# Patient Record
Sex: MALE | Race: WHITE | URBAN - METROPOLITAN AREA
[De-identification: names, ages, dates, MRNs, and addresses within clinical notes are randomized per-mention and may not be internally consistent; named-entity substitution may affect disease eponyms.]

---

## 2017-02-15 ENCOUNTER — HISTORICAL (OUTPATIENT)
Dept: ADMINISTRATIVE | Facility: HOSPITAL | Age: 62
End: 2017-02-15

## 2017-08-27 ENCOUNTER — HISTORICAL (OUTPATIENT)
Dept: ADMINISTRATIVE | Facility: HOSPITAL | Age: 62
End: 2017-08-27

## 2022-09-13 NOTE — ED PROVIDER NOTES
Patient:   Raphael Hayes            MRN: [id number]            FIN: [id number]               Age:   62 years     Sex:  Male     :  1955   Associated Diagnoses:   Vertigo   Author:   Tucker Gorman      Basic Information   Time seen: Date & time 2017 02:50:00.   History source: Patient, sister.   Arrival mode: Ambulance.   Additional information: Patient's physician(s): MONICO Andres MD, Chief Complaint from Nursing Triage Note : Chief Complaint   2017 2:34 CDT       Chief Complaint           patient reports nausea, dizziness and headache since   .      History of Present Illness   The patient presents with dizziness and Male with history of cerebral infarct since childhood and chronic lifelong right sided hemiparalysis currently living at [facility] reported to staff this evening waking from sleep with headache spinning dizziness and nausea.  Patient given Tylenol for headache.  Patient denies recent URI symptoms.  Patient transported to ER for evaluation..  The onset was 3  hours ago.  The character of symptoms is spinning.  The degree at present is moderate.  Exacerbating factors:  not changing position or turning head.  Therapy today: over the counter medications including Tylenol and emergency medical services.        Review of Systems   Constitutional symptoms:  No fever, no chills, no fatigue.    Skin symptoms:  Negative except as documented in HPI.   Eye symptoms:  No diplopia, no blurred vision.    ENMT symptoms:  No ear pain, no sore throat, no nasal congestion, no sinus pain.    Respiratory symptoms:  No shortness of breath, no cough, no sputum production.    Cardiovascular symptoms:  No chest pain, no palpitations.    Gastrointestinal symptoms:  Nausea, no abdominal pain, no vomiting, no diarrhea.    Musculoskeletal symptoms:  Negative except as documented in HPI.   Neurologic symptoms:  Headache, No altered level of consciousness,    Psychiatric symptoms:   Negative except as documented in HPI.   Endocrine symptoms:  Negative except as documented in HPI.   Hematologic/Lymphatic symptoms:  Negative except as documented in HPI.      Health Status   Allergies:    Allergic Reactions (Selected)  No Known Medication Allergies,    Allergies (1) Active Reaction  No Known Medication Allergies None Documented  .   Medications:  (Selected)   Prescriptions  Prescribed  Voltaren- mg oral tablet, extended release: 100 mg = 1 tab(s), Oral, Daily, # 90 tab(s), 0 Refill(s)  Documented Medications  Documented  Flomax 0.4 mg oral capsule: 0.4 mg = 1 cap(s), Oral, Daily, # 30 cap(s), 0 Refill(s)  Florastor 250 mg oral capsule: 250 mg = 1 cap(s), Oral, BID, PRN PRN for loose stool, 0 Refill(s)  Fosamax 70 mg oral tablet: 70 mg = 1 tab(s), Oral, qWeek, # 12 tab(s), 0 Refill(s)  Multiple Vitamins oral cap: 1 cap(s), Oral, Daily, 0 Refill(s)  Norco 5 mg-325 mg oral tablet: 1 tab(s), Oral, q4hr, PRN PRN for pain, # 18 tab(s), 0 Refill(s)  Prilosec 20 mg oral DR capsule: 20 mg = 1 cap(s), Oral, Daily, 0 Refill(s)  Proctosedyl rectal ointment: 0 Refill(s)  Synthroid 88 mcg (0.088 mg) oral tablet: 88 mcg = 1 tab(s), Oral, Daily, # 30 tab(s), 0 Refill(s)  Vitamin D3 5000 intl units oral capsule: 5,000 IntUnit = 1 cap(s), Oral, Daily, with food, # 100 cap(s), 0 Refill(s)  calcium (as calcium citrate) 250 mg oral tablet: 250 mg = 1 tab(s), Oral, BID, 0 Refill(s)  menthol 2% topical gel: 1 alexi, TOP, BID, PRN PRN as needed for pain, # 226 gm, 0 Refill(s).      Past Medical/ Family/ Social History   Medical history:    No active or resolved past medical history items have been selected or recorded..   Surgical history:    Tonsils obstructive ([id number])..   Family history:    Unable to obtain..      Physical Examination               Vital Signs   Vital Signs   8/27/2017 2:34 CDT       Temperature Oral          36.8 DegC                             Peripheral Pulse Rate     110 bpm  HI                              Respiratory Rate          20 br/min                             SpO2                      96 %                             Oxygen Therapy            Room air                             Systolic Blood Pressure   141 mmHg  HI                             Diastolic Blood Pressure  95 mmHg  HI  .      Vital Signs (last 24 hrs)_____  Last Charted___________  Temp Oral     36.8 DegC  (AUG 27 02:34)  Heart Rate Peripheral   H 110bpm  (AUG 27 02:34)  Resp Rate         20 br/min  (AUG 27 02:34)  SBP      H 141mmHg  (AUG 27 02:34)  DBP      H 95mmHg  (AUG 27 02:34)  SpO2      96 %  (AUG 27 02:34)  .   Measurements   8/27/2017 2:34 CDT       Weight Dosing             75 kg                             Weight Measured and Calculated in Lbs     165.34 lb                             Weight Estimated          75 kg  .   Basic Oxygen Information   8/27/2017 2:34 CDT       SpO2                      96 %                             Oxygen Therapy            Room air  .   General:  Alert, no acute distress, Awake alert male attended by sister.    Skin:  Warm, dry, pink, normal for ethnicity.    Head:  Normocephalic, atraumatic.    Neck:  Supple, trachea midline, no JVD.    Eye:  Pupils are equal, round and reactive to light, extraocular movements are intact, normal conjunctiva.    Ears, nose, mouth and throat:  Oral mucosa moist.   Cardiovascular:  Normal peripheral perfusion, Tachycardia.    Respiratory:  Lungs are clear to auscultation, respirations are non-labored, breath sounds are equal, Symmetrical chest wall expansion.    Chest wall:  No tenderness, No deformity.    Back:  Nontender, Normal range of motion.    Musculoskeletal:  Right joann-paralysis.   Gastrointestinal:  Soft, Nontender, Non distended.    Neurological:  Level of consciousness: Appropriate for age.   Psychiatric:  Cooperative, mild anxiety.       Medical Decision Making   Documents reviewed:  Emergency department nurses' notes.   Orders  Launch  Orders   Laboratory:  INR - Protime (Order): Stat collect, 8/27/2017 3:04 CDT, Blood, Lab Collect, Print Label By Order Location, 8/27/2017 3:04 CDT  PTT (Order): Stat collect, 8/27/2017 3:04 CDT, Blood, Lab Collect, Print Label By Order Location, 8/27/2017 3:04 CDT  Troponin-I (Order): Stat collect, 8/27/2017 3:04 CDT, Blood, Lab Collect, Print Label By Order Location, 8/27/2017 3:04 CDT  Urinalysis Complete a reflex to culture (Order): Stat collect, Urine, 8/27/2017 3:03 CDT, Nurse collect, Print Label By Order Location  CBC w/ Auto Diff (Order): Now collect, 8/27/2017 3:03 CDT, Blood, Lab Collect, Print Label By Order Location, 8/27/2017 3:03 CDT  CMP (Order): Stat collect, 8/27/2017 3:03 CDT, Blood, Lab Collect, Print Label By Order Location, 8/27/2017 3:03 CDT  Patient Care:  IV Fluids (Order)  Pharmacy:  meclizine (Order): 50 mg, form: Tab, Oral, Once, first dose 8/27/2017 3:04 CDT, stop date 8/27/2017 3:04 CDT, 24  Zofran 2 mg/mL injectable solution (Order): 4 mg, form: Injection, IV Push, Once, first dose 8/27/2017 3:04 CDT, stop date 8/27/2017 3:04 CDT, 24  IVF Normal Saline NS Bolus 1000ml 1000 mL (Order): 1,000 mL, 1,000 mL, IV, 1000 mL, start date 8/27/2017 3:03 CDT  Radiology:  CT Head W/O Contrast (Order): Stat, 8/27/2017 3:02 CDT, Dizziness , vertigo, None, Stretcher, Rad Type, Schedule this test, [facility]  Cardiology:  EKG Adult 12 Lead (Order): 8/27/2017 3:05 CDT, Stat, Once, 24, 8/27/2017 3:05 CDT, -1, -1, 8/27/2017 3:05 CDT, Launch Orders   Pharmacy:  Ativan (Order): 1 mg, form: Tab, Oral, Once, first dose 8/27/2017 5:46 CDT, stop date 8/27/2017 5:46 CDT, 24  Lopressor 1 mg/mL injectable solution (Order): 5 mg, form: Injection, IV Push, Once-NOW, first dose 8/27/2017 5:45 CDT, stop date 8/27/2017 5:45 CDT, 965,196  .    Electrocardiogram:  Time 8/27/2017 03:11:00, rate 109, No ST-T changes, no ectopy, normal LA & QRS intervals, EP Interp, The Rhythm is sinus tachycardia.  , The Axis is  leftward.  .    Results review:  Lab results : Lab View   8/27/2017 3:14 CDT       Sodium Lvl                140 mmol/L                             Potassium Lvl             3.7 mmol/L                             Chloride                  105 mmol/L                             CO2                       27.0 mmol/L                             Calcium Lvl               8.3 mg/dL  LOW                             Glucose Lvl               106 mg/dL                             BUN                       17.0 mg/dL                             Creatinine                0.90 mg/dL                             eGFR-AA                   >60 mL/min/1.73 m2  NA                             eGFR-JULIEN                  >60 mL/min/1.73 m2  NA                             Bili Total                0.4 mg/dL                             Bili Direct               0.10 mg/dL                             Bili Indirect             0.30 mg/dL                             AST                       12 unit/L  LOW                             ALT                       22 unit/L                             Alk Phos                  55 unit/L                             Total Protein             7.6 gm/dL                             Albumin Lvl               4.00 gm/dL                             Globulin                  3.60 gm/dL  HI                             A/G Ratio                 1.1  NA                             Troponin-I                <0.02 ng/mL                             PT                        13.3 second(s)                             INR                       1.03                             PTT                       30.7 second(s)                             WBC                       4.4 x10(3)/mcL  LOW                             RBC                       5.50 x10(6)/mcL                             Hgb                       15.5 gm/dL                             Hct                       48.1 %                              Platelet                  136 x10(3)/mcL                             MCV                       87.5 fL                             MCH                       28.2 pg                             MCHC                      32.2 gm/dL  LOW                             RDW                       12.5 %                             MPV                       9.0 fL  LOW                             Abs Neut                  2.65 x10(3)/mcL                             Neutro Auto               61 %  NA                             Lymph Auto                30 %  NA                             Mono Auto                 9 %  NA                             Eos Auto                  0 %  NA                             Abs Eos                   0.0 x10(3)/mcL                             Basophil Auto             0 %  NA                             Abs Neutro                2.65 x10(3)/mcL                             Abs Lymph                 1.3 x10(3)/mcL                             Abs Mono                  0.4 x10(3)/mcL                             Abs Baso                  0.0 x10(3)/mcL    8/7/2017 5:32 CDT        TSH                       4.250 mIU/mL  HI    ,    No qualifying data available.    Head Computed Tomography:  No acute disease process, no intracranial hemorrhage, interpretation by Radiologist, Dr Helms.  Remote lacunar infarcts seen in basal ganglia.       Reexamination/ Reevaluation   Vital signs   results included from flowsheet : Vital Signs   8/27/2017 6:32 CDT       Peripheral Pulse Rate     102 bpm  HI                             Respiratory Rate          20 br/min                             SpO2                      95 %                             Oxygen Therapy            Room air                             Systolic Blood Pressure   117 mmHg                             Diastolic Blood Pressure  86 mmHg                             Mean Arterial Pressure, Cuff              96 mmHg      results included from  flowsheet : Vital Signs   8/27/2017 6:02 CDT       Peripheral Pulse Rate     113 bpm  HI                             Respiratory Rate          18 br/min                             SpO2                      96 %                             Systolic Blood Pressure   132 mmHg                             Diastolic Blood Pressure  94 mmHg  HI      Basic Oxygen Information   8/27/2017 2:34 CDT       SpO2                      96 %                             Oxygen Therapy            Room air     Assessment: While here in the ED, the pt remained hemodynamically stable with ABC's intact and in NaD.  Pt is afebrile and nontoxic.  Pt awake alert resting in ED room with no further dizziness nor headache.  Patient tolerates oral medication.  Pt finishing IV fluids.  Patient agitated and reports he is ready for discharge.  Pt and sister understand lab, CT results, concern for dizziness suspect vertigo.  Sister understands discharge plan with Rx meclizine and patient follow-up with primary care physician or return to ER sooner if worsens.  Sister verbalizes understanding agreeing and satisfied with plan.  .      Impression and Plan   Diagnosis   Vertigo ([id number])   Plan   Condition: Improved, Stable.    Disposition: Discharged: Time  8/27/2017 06:11:00, to a nursing home.    Prescriptions: Launch prescriptions   Pharmacy:  meclizine 25 mg oral tablet (Prescribe): 50 mg = 2 tab(s), Oral, TID, PRN PRN for dizziness, X 3 day(s), # 18 tab(s), 0 Refill(s)  .    Patient was given the following educational materials: Vertigo.    Follow up with: Follow up with primary care provider Recommend Tylenol ibuprofen or hydrocodone as needed for pain.  Recommend meclizine as needed for spinning dizziness or vertigo.  Recommend follow-up with primary care physician in 2-3 days to review headache, dizziness, vertigo.  Return to ED if symptoms worsen.    Counseled: Patient, Family, Regarding diagnosis, Regarding diagnostic results, Regarding  treatment plan, Regarding prescription, Patient indicated understanding of instructions.